# Patient Record
Sex: FEMALE | Race: WHITE
[De-identification: names, ages, dates, MRNs, and addresses within clinical notes are randomized per-mention and may not be internally consistent; named-entity substitution may affect disease eponyms.]

---

## 2021-11-30 ENCOUNTER — HOSPITAL ENCOUNTER (EMERGENCY)
Dept: HOSPITAL 12 - ER | Age: 51
Discharge: LEFT BEFORE DISCHARGE | End: 2021-11-30
Payer: COMMERCIAL

## 2021-11-30 VITALS — BODY MASS INDEX: 28.82 KG/M2 | HEIGHT: 65 IN | WEIGHT: 173 LBS

## 2021-11-30 VITALS — DIASTOLIC BLOOD PRESSURE: 71 MMHG | SYSTOLIC BLOOD PRESSURE: 131 MMHG

## 2021-11-30 DIAGNOSIS — R23.8: ICD-10-CM

## 2021-11-30 DIAGNOSIS — Z79.899: ICD-10-CM

## 2021-11-30 DIAGNOSIS — Z86.16: ICD-10-CM

## 2021-11-30 DIAGNOSIS — R00.2: Primary | ICD-10-CM

## 2021-11-30 DIAGNOSIS — E78.5: ICD-10-CM

## 2021-11-30 DIAGNOSIS — Z88.0: ICD-10-CM

## 2021-11-30 DIAGNOSIS — F41.9: ICD-10-CM

## 2021-11-30 DIAGNOSIS — R77.8: ICD-10-CM

## 2021-11-30 LAB
ALP SERPL-CCNC: 73 U/L (ref 50–136)
ALT SERPL W/O P-5'-P-CCNC: 35 U/L (ref 14–59)
AST SERPL-CCNC: 23 U/L (ref 15–37)
BILIRUB SERPL-MCNC: 0.4 MG/DL (ref 0.2–1)
BUN SERPL-MCNC: 18 MG/DL (ref 7–18)
CHLORIDE SERPL-SCNC: 103 MMOL/L (ref 98–107)
CO2 SERPL-SCNC: 28 MMOL/L (ref 21–32)
CREAT SERPL-MCNC: 1 MG/DL (ref 0.6–1.3)
GLUCOSE SERPL-MCNC: 123 MG/DL (ref 74–106)
HCT VFR BLD AUTO: 40.2 % (ref 31.2–41.9)
MCH RBC QN AUTO: 30.1 UUG (ref 24.7–32.8)
MCV RBC AUTO: 89.3 FL (ref 75.5–95.3)
PLATELET # BLD AUTO: 227 K/UL (ref 179–408)
POTASSIUM SERPL-SCNC: 4.3 MMOL/L (ref 3.5–5.1)
TSH SERPL DL<=0.005 MIU/L-ACNC: 1.6 MIU/ML (ref 0.36–3.74)
WS STN SPEC: 7.8 G/DL (ref 6.4–8.2)

## 2021-11-30 PROCEDURE — A4663 DIALYSIS BLOOD PRESSURE CUFF: HCPCS

## 2021-11-30 NOTE — NUR
PT DECIDED TO LEAVE HOSPITAL AMA. DR BARRAZA EXPLAINED ALL RISKS OF LEAVING 
HOSPITAL AMA TO THE PT. PT WERBALIZED FULL UNDERSTANDING. PT SIGNED AMA FORM 
AND LEFT HOSPITAL  BY TAXI.

## 2022-11-20 ENCOUNTER — HOSPITAL ENCOUNTER (INPATIENT)
Dept: HOSPITAL 54 - ER | Age: 52
LOS: 1 days | Discharge: HOME | DRG: 282 | End: 2022-11-21
Attending: INTERNAL MEDICINE | Admitting: NURSE PRACTITIONER
Payer: COMMERCIAL

## 2022-11-20 VITALS — WEIGHT: 175 LBS | HEIGHT: 66 IN | BODY MASS INDEX: 28.12 KG/M2

## 2022-11-20 DIAGNOSIS — I21.4: Primary | ICD-10-CM

## 2022-11-20 DIAGNOSIS — Z88.0: ICD-10-CM

## 2022-11-20 DIAGNOSIS — E78.00: ICD-10-CM

## 2022-11-20 DIAGNOSIS — Z86.16: ICD-10-CM

## 2022-11-20 DIAGNOSIS — F41.9: ICD-10-CM

## 2022-11-20 DIAGNOSIS — K57.30: ICD-10-CM

## 2022-11-20 DIAGNOSIS — I51.7: ICD-10-CM

## 2022-11-20 DIAGNOSIS — E87.6: ICD-10-CM

## 2022-11-20 LAB
ALBUMIN SERPL BCP-MCNC: 3.9 G/DL (ref 3.4–5)
ALP SERPL-CCNC: 59 U/L (ref 46–116)
ALT SERPL W P-5'-P-CCNC: 28 U/L (ref 12–78)
AST SERPL W P-5'-P-CCNC: 23 U/L (ref 15–37)
BASOPHILS # BLD AUTO: 0 K/UL (ref 0–0.2)
BASOPHILS NFR BLD AUTO: 0.1 % (ref 0–2)
BILIRUB DIRECT SERPL-MCNC: 0.1 MG/DL (ref 0–0.2)
BILIRUB SERPL-MCNC: 0.5 MG/DL (ref 0.2–1)
BILIRUB UR QL STRIP: NEGATIVE
BUN SERPL-MCNC: 14 MG/DL (ref 7–18)
CALCIUM SERPL-MCNC: 8.8 MG/DL (ref 8.5–10.1)
CHLORIDE SERPL-SCNC: 111 MMOL/L (ref 98–107)
CO2 SERPL-SCNC: 26 MMOL/L (ref 21–32)
COLOR UR: YELLOW
CREAT SERPL-MCNC: 0.8 MG/DL (ref 0.6–1.3)
EOSINOPHIL NFR BLD AUTO: 0.8 % (ref 0–6)
GLUCOSE SERPL-MCNC: 122 MG/DL (ref 74–106)
GLUCOSE UR STRIP-MCNC: NEGATIVE MG/DL
HCT VFR BLD AUTO: 39 % (ref 33–45)
HGB BLD-MCNC: 12.9 G/DL (ref 11.5–14.8)
LEUKOCYTE ESTERASE UR QL STRIP: (no result)
LYMPHOCYTES NFR BLD AUTO: 2.5 K/UL (ref 0.8–4.8)
LYMPHOCYTES NFR BLD AUTO: 34.2 % (ref 20–44)
MCHC RBC AUTO-ENTMCNC: 33 G/DL (ref 31–36)
MCV RBC AUTO: 90 FL (ref 82–100)
MONOCYTES NFR BLD AUTO: 0.8 K/UL (ref 0.1–1.3)
MONOCYTES NFR BLD AUTO: 10.6 % (ref 2–12)
NEUTROPHILS # BLD AUTO: 3.9 K/UL (ref 1.8–8.9)
NEUTROPHILS NFR BLD AUTO: 54.3 % (ref 43–81)
NITRITE UR QL STRIP: NEGATIVE
PH UR STRIP: 5.5 [PH] (ref 5–8)
PLATELET # BLD AUTO: 226 K/UL (ref 150–450)
POTASSIUM SERPL-SCNC: 3.3 MMOL/L (ref 3.5–5.1)
PROT SERPL-MCNC: 7.2 G/DL (ref 6.4–8.2)
PROT UR QL STRIP: NEGATIVE MG/DL
RBC # BLD AUTO: 4.37 MIL/UL (ref 4–5.2)
RBC #/AREA URNS HPF: (no result) /HPF (ref 0–2)
SODIUM SERPL-SCNC: 139 MMOL/L (ref 136–145)
UROBILINOGEN UR STRIP-MCNC: 0.2 EU/DL
WBC #/AREA URNS HPF: (no result) /HPF (ref 0–3)
WBC NRBC COR # BLD AUTO: 7.3 K/UL (ref 4.3–11)

## 2022-11-20 PROCEDURE — C9113 INJ PANTOPRAZOLE SODIUM, VIA: HCPCS

## 2022-11-20 PROCEDURE — C9803 HOPD COVID-19 SPEC COLLECT: HCPCS

## 2022-11-20 PROCEDURE — G0378 HOSPITAL OBSERVATION PER HR: HCPCS

## 2022-11-20 NOTE — NUR
BIBS C/O INTERMITTENT CP, N/V, BURPING X 1400. PATIENT IS AAOX4. ABLE TO MAKE 
NEEDS KNOWN. VOMITED SEVERAL TIMES AT ER. ATTACHED TO MONITOR. VITALS CHECKED.

## 2022-11-21 VITALS — SYSTOLIC BLOOD PRESSURE: 115 MMHG | DIASTOLIC BLOOD PRESSURE: 59 MMHG

## 2022-11-21 VITALS — SYSTOLIC BLOOD PRESSURE: 116 MMHG | DIASTOLIC BLOOD PRESSURE: 61 MMHG

## 2022-11-21 VITALS — DIASTOLIC BLOOD PRESSURE: 58 MMHG | SYSTOLIC BLOOD PRESSURE: 102 MMHG

## 2022-11-21 VITALS — SYSTOLIC BLOOD PRESSURE: 113 MMHG | DIASTOLIC BLOOD PRESSURE: 54 MMHG

## 2022-11-21 VITALS — SYSTOLIC BLOOD PRESSURE: 119 MMHG | DIASTOLIC BLOOD PRESSURE: 72 MMHG

## 2022-11-21 VITALS — DIASTOLIC BLOOD PRESSURE: 70 MMHG | SYSTOLIC BLOOD PRESSURE: 111 MMHG

## 2022-11-21 VITALS — DIASTOLIC BLOOD PRESSURE: 76 MMHG | SYSTOLIC BLOOD PRESSURE: 104 MMHG

## 2022-11-21 LAB
MAGNESIUM SERPL-MCNC: 2.3 MG/DL (ref 1.8–2.4)
PHOSPHATE SERPL-MCNC: 3.8 MG/DL (ref 2.5–4.9)
TSH SERPL DL<=0.005 MIU/L-ACNC: 2.29 UIU/ML (ref 0.36–3.74)

## 2022-11-21 NOTE — NUR
ORTHOSTATIC BP



LYING DOWN- 116/61 HR-69

SITTING- 119/72 HR-70

STANDING 104/76 HR-86



PATIENT DENIES ANY SYNCOPE FEELING/ LIGHT HEADEDNESS DURING AND AFTER ASSESSMENT.

## 2022-11-21 NOTE — NUR
RN NOTES



RECEIVED CALL FROM  JENNY SHAY THAT PT STILL WITH CRITICAL HIGH TROPONIN 314, DR STEELE MADE AWARE WITH NO NEW ORDER MADE AT THIS TIME.

## 2022-11-21 NOTE — NUR
RN NOTES



CTCA PROCEDURE WELL TOLERATED BY THE PT. PT IS AAOX4, NOT IN RESPIRATORY DISTRESS, V/S 
STABLE, KEPT RESTED AND COMFORTABLE. REPORT GIVEN TO PRAVEEN MENDIOLA FOR KATHLEEN.

## 2022-11-21 NOTE — NUR
noc rn note- re-assessment



bp 98/57, patient state relief of chest pain. will continue to monitor. reading sb on tele 
monitor with 57 bpm.

## 2022-11-21 NOTE — NUR
RN NOTES



CTCA RESULTS SHOWS NO SIGNIFICANT CORONARY ARTERY CALCIFICATION AND DISEASE. DR STEELE MADE 
AWARE AND CLEARED PT FOR DC--HOME. DR DIXON MADE AWARE TO PUT DOWN THE DC ORDER.

## 2022-11-21 NOTE — NUR
RN DISCHARGE NOTES



PT DISCHARGED HOME IN STABLE CONDITION. A/O X4. ABLE TO MAKE NEEDS KNOWN, AMBULATORY WITH 
STEADY GAIT. V/S TAKEN, STABLE AND RECORDED. PT WITH INTACT SKIN WITH NO IMPAIRMENTS. ALL 
BELONGINGS ACCOUNTED FOR AND PT SIGNED BELONGINGS LIST. IV ACCESS ON LAC G#20 REMOVED WITH 
NO ACTIVE BLEEDING NOTED, DRY PRESSURE DRESSING APPLIED AT SITE. HEALTH TEACHINGS/DISCHARGED 
INSTRUCTIONS GIVEN TO PT AND VERBALIZED UNDERSTANDING. EXIT FOLDER HANDED TO PT. TELE-BOX 
REMOVED FROM PT AND HANDED TO [place], MONITOR TECH.  PT LEFT UNIT @ 1755 AMBULATORY  
ACCOMPANIED BY ME AND HER DAUGHTER ORQUIDEA.  MD AND CHARGE NURSE AWARE OF DISCHARGE.

## 2022-11-21 NOTE — NUR
RN ADMISSION NOTES



PATIENT BROUGHT IN UNIT VIA STRETCHER AT AROUND THIS TIME. ACCOMPANIED BY 1 ER PERSONNEL. 
PATIENT IS A/OX4. NO S/S OF APPARENT DISTRESS ON ROOM AIR. PATIENT C/O "A LITTLE" CHEST PAIN 
THAT IS NON-RADIATING. DENIES ANY N/V AT THIS TIME. TELE MONITOR READING SR IN THE 60'S. NEW 
ID BAND ON PATIENT. BELONGINGS CHECKED. PATIENT WISHES TO BE FULL CODE AT THIS TIME. 
UP-TO-DATE WITH HER FLU AND COVID IMMUNIZATIONS. DENIES SMOKING AND ALCOHOL ABUSE. PATIENT 
HAS IV ACCESS ON LT. AC #20G. PATIENT ORIENTED IN THE UNIT AND THE USE OF CALL LIGHT. 
AMBULATORY WITH SAFETY GAIT. PATIENT REFUSED TO BE IN A HOSPITAL GOWN BECAUSE PER HER SHE 
WANTS TO BE COMFORTABLE. PATIENT HAS INTACT SKIN. NEEDS ATTENDED FOR NOW. WILL FOLLOW 
THROUGH DOCTOR'S ORDERS AND CONTINUE WITH THE PLAN OF CARE FOR PATIENT.

## 2022-11-21 NOTE — NUR
noc rn note



Patient c/o a little chest pain that is non-radiating per patient. given Nitro as ordered 
one time. bp 113/54 hr- 76. patient teaching done about risk and benefits of nitro. patient 
acknowledged. will re-assess after 5 min.

## 2022-11-21 NOTE — NUR
TELE RN OPENING NOTES



RECEIVED PT AWAKE IN BED IN NO ACUTE SIGN SOF DISTRESS. A/O x4, ABLE TO MAKE NEEDS KNOWN, 
DENIES PAIN OR ANY DISCOMFORTS AT THIS TIME. ON ROOM AIR, TOLERATING WELL WITH NO S/S OF 
RESPIRATORY DISTRESS. ON TELE MONITORING SHOWING NSR, HR 60 AT THIS TIME, NO C/O CARDIAC 
DISTRESS VOICED. IV ACCESS ON LAC G#20 INTACT, PATENT AND FLUSHES WELL. SAFETY MEASURES IN 
PLACE: BED LOCKED AND IN LOWEST POSITION, SIDE RAILS UP x2, CALL LIGHT WITHIN REACH. WILL 
CONTINUE TO MONITOR PT ACCORDINGLY.

## 2025-06-14 ENCOUNTER — HOSPITAL ENCOUNTER (INPATIENT)
Dept: HOSPITAL 12 - ER | Age: 55
LOS: 2 days | Discharge: HOME | DRG: 205 | End: 2025-06-16
Payer: COMMERCIAL

## 2025-06-14 VITALS — WEIGHT: 174 LBS | BODY MASS INDEX: 28.99 KG/M2 | HEIGHT: 65 IN

## 2025-06-14 DIAGNOSIS — Z88.0: ICD-10-CM

## 2025-06-14 DIAGNOSIS — Z79.899: ICD-10-CM

## 2025-06-14 DIAGNOSIS — Z82.49: ICD-10-CM

## 2025-06-14 DIAGNOSIS — F41.9: ICD-10-CM

## 2025-06-14 DIAGNOSIS — M94.0: Primary | ICD-10-CM

## 2025-06-14 DIAGNOSIS — E78.00: ICD-10-CM

## 2025-06-14 DIAGNOSIS — I21.A1: ICD-10-CM

## 2025-06-14 PROCEDURE — G0378 HOSPITAL OBSERVATION PER HR: HCPCS

## 2025-06-15 VITALS — DIASTOLIC BLOOD PRESSURE: 57 MMHG | SYSTOLIC BLOOD PRESSURE: 120 MMHG | OXYGEN SATURATION: 96 % | TEMPERATURE: 94.7 F

## 2025-06-15 VITALS — TEMPERATURE: 98.3 F | SYSTOLIC BLOOD PRESSURE: 123 MMHG | DIASTOLIC BLOOD PRESSURE: 71 MMHG | OXYGEN SATURATION: 98 %

## 2025-06-15 VITALS — DIASTOLIC BLOOD PRESSURE: 61 MMHG | SYSTOLIC BLOOD PRESSURE: 117 MMHG | OXYGEN SATURATION: 96 % | TEMPERATURE: 97.3 F

## 2025-06-15 LAB
ALBUMIN SERPL BCG-MCNC: 3.5 G/DL (ref 3.4–5)
ALP SERPL-CCNC: 71 U/L (ref 50–136)
ALT SERPL W/O P-5'-P-CCNC: 14 U/L (ref 14–59)
ANISOCYTOSIS BLD QL: (no result)
ANISOCYTOSIS BLD QL: (no result)
AST SERPL-CCNC: 14 U/L (ref 15–37)
AUER BODIES BLD QL SMEAR: (no result)
AUER BODIES BLD QL SMEAR: (no result)
BASO STIPL BLD QL SMEAR: (no result)
BASO STIPL BLD QL SMEAR: (no result)
BASOPHILS # BLD AUTO: 0 K/UL (ref 0–0.2)
BASOPHILS # BLD AUTO: 0 K/UL (ref 0–0.2)
BASOPHILS NFR BLD AUTO: 0.2 % (ref 0–2)
BASOPHILS NFR BLD AUTO: 0.5 % (ref 0–2)
BILIRUB DIRECT SERPL-MCNC: 0.1 MG/DL (ref 0–0.2)
BILIRUB SERPL-MCNC: 0.3 MG/DL (ref 0.2–1)
BUN SERPL-MCNC: 18 MG/DL (ref 7–18)
BUN SERPL-MCNC: 22 MG/DL (ref 7–18)
CABOT RINGS BLD QL SMEAR: (no result)
CABOT RINGS BLD QL SMEAR: (no result)
CALCIUM SERPL-MCNC: 9.1 MG/DL (ref 8.5–10.1)
CALCIUM SERPL-MCNC: 9.4 MG/DL (ref 8.5–10.1)
CHLORIDE SERPL-SCNC: 105 MMOL/L (ref 98–107)
CHLORIDE SERPL-SCNC: 105 MMOL/L (ref 98–107)
CO2 SERPL-SCNC: 26 MMOL/L (ref 21–32)
CO2 SERPL-SCNC: 27 MMOL/L (ref 21–32)
CREAT SERPL-MCNC: 0.7 MG/DL (ref 0.6–1.3)
CREAT SERPL-MCNC: 0.8 MG/DL (ref 0.6–1.3)
DACRYOCYTES BLD QL SMEAR: (no result)
DACRYOCYTES BLD QL SMEAR: (no result)
DOHLE BOD BLD QL SMEAR: (no result)
DOHLE BOD BLD QL SMEAR: (no result)
EOSINOPHIL # BLD AUTO: 0 K/UL (ref 0–0.7)
EOSINOPHIL # BLD AUTO: 0.1 K/UL (ref 0–0.7)
EOSINOPHIL NFR BLD AUTO: 0.5 % (ref 0–7)
EOSINOPHIL NFR BLD AUTO: 0.9 % (ref 0–7)
ERYTHROCYTE [DISTWIDTH] IN BLOOD BY AUTOMATED COUNT: 13.1 % (ref 12.3–17.7)
ERYTHROCYTE [DISTWIDTH] IN BLOOD BY AUTOMATED COUNT: 13.5 % (ref 12.3–17.7)
GLUCOSE SERPL-MCNC: 111 MG/DL (ref 74–106)
GLUCOSE SERPL-MCNC: 117 MG/DL (ref 74–106)
HCT VFR BLD AUTO: 38.6 % (ref 31.2–41.9)
HCT VFR BLD AUTO: 38.6 % (ref 31.2–41.9)
HELMET CELLS BLD QL SMEAR: (no result)
HELMET CELLS BLD QL SMEAR: (no result)
HGB BLD-MCNC: 13.1 G/DL (ref 10.9–14.3)
HGB BLD-MCNC: 13.1 G/DL (ref 10.9–14.3)
HOWELL-JOLLY BOD BLD QL SMEAR: (no result)
HOWELL-JOLLY BOD BLD QL SMEAR: (no result)
HYPOCHROMIA BLD QL: (no result)
HYPOCHROMIA BLD QL: (no result)
LYMPHOCYTES # BLD AUTO: 1.5 K/UL (ref 0.8–4.8)
LYMPHOCYTES # BLD AUTO: 1.9 K/UL (ref 0.8–4.8)
LYMPHOCYTES NFR BLD AUTO: 23.8 % (ref 20.5–51.5)
LYMPHOCYTES NFR BLD AUTO: 25 % (ref 20.5–51.5)
MACROCYTES BLD QL: (no result)
MACROCYTES BLD QL: (no result)
MAGNESIUM SERPL-MCNC: 2.1 MG/DL (ref 1.8–2.4)
MANUAL DIF COMMENT BLD-IMP: 1
MANUAL DIF COMMENT BLD-IMP: 1
MCH RBC QN AUTO: 30.2 UUG (ref 24.7–32.8)
MCH RBC QN AUTO: 30.3 UUG (ref 24.7–32.8)
MCHC RBC AUTO-ENTMCNC: 34 G/DL (ref 32.3–35.6)
MCHC RBC AUTO-ENTMCNC: 34 G/DL (ref 32.3–35.6)
MCV RBC AUTO: 88.7 FL (ref 75.5–95.3)
MCV RBC AUTO: 89.3 FL (ref 75.5–95.3)
MONOCYTES # BLD AUTO: 0.4 K/UL (ref 0.1–1.3)
MONOCYTES # BLD AUTO: 0.6 K/UL (ref 0.1–1.3)
MONOCYTES NFR BLD AUTO: 5.8 % (ref 0–11)
MONOCYTES NFR BLD AUTO: 8 % (ref 0–11)
NEUTROPHILS # BLD AUTO: 4.4 K/UL (ref 1.8–8.9)
NEUTROPHILS # BLD AUTO: 5 K/UL (ref 1.8–8.9)
NEUTROPHILS NFR BLD AUTO: 65.6 % (ref 38.5–71.5)
NEUTROPHILS NFR BLD AUTO: 69.7 % (ref 38.5–71.5)
NEUTS HYPERSEG # BLD: (no result) 10*3/UL
NEUTS HYPERSEG # BLD: (no result) 10*3/UL
NT-PROBNP SERPL-MCNC: 21 PG/ML (ref 0–125)
NT-PROBNP SERPL-MCNC: 26 PG/ML (ref 0–125)
OVALOCYTES BLD QL SMEAR: (no result)
OVALOCYTES BLD QL SMEAR: (no result)
PAPPENHEIMER BOD BLD QL SMEAR: (no result)
PAPPENHEIMER BOD BLD QL SMEAR: (no result)
PHOSPHATE SERPL-MCNC: 4.1 MG/DL (ref 2.5–4.9)
PLATELET # BLD AUTO: 213 K/UL (ref 179–408)
PLATELET # BLD AUTO: 223 K/UL (ref 179–408)
POTASSIUM SERPL-SCNC: 4 MMOL/L (ref 3.5–5.1)
POTASSIUM SERPL-SCNC: 4 MMOL/L (ref 3.5–5.1)
RBC # BLD AUTO: 4.32 MIL/UL (ref 3.63–4.92)
RBC # BLD AUTO: 4.35 MIL/UL (ref 3.63–4.92)
ROULEAUX BLD QL SMEAR: (no result)
ROULEAUX BLD QL SMEAR: (no result)
SMUDGE CELLS BLD QL SMEAR: (no result)
SMUDGE CELLS BLD QL SMEAR: (no result)
SODIUM SERPL-SCNC: 140 MMOL/L (ref 136–145)
SODIUM SERPL-SCNC: 140 MMOL/L (ref 136–145)
STOMATOCYTES BLD QL SMEAR: (no result)
STOMATOCYTES BLD QL SMEAR: (no result)
TARGETS BLD QL SMEAR: (no result)
TARGETS BLD QL SMEAR: (no result)
TOXIC GRANULES BLD QL SMEAR: (no result)
TOXIC GRANULES BLD QL SMEAR: (no result)
WBC # BLD AUTO: 6.3 K/UL (ref 3.8–11.8)
WBC # BLD AUTO: 7.7 K/UL (ref 3.8–11.8)
WS STN SPEC: 7.1 G/DL (ref 6.4–8.2)

## 2025-06-15 RX ADMIN — TEMAZEPAM SCH MG: 15 CAPSULE ORAL at 20:54

## 2025-06-15 RX ADMIN — HEPARIN SODIUM SCH UNITS: 5000 INJECTION, SOLUTION INTRAVENOUS; SUBCUTANEOUS at 08:28

## 2025-06-15 RX ADMIN — LORAZEPAM ONE MG: 2 INJECTION INTRAMUSCULAR; INTRAVENOUS at 04:41

## 2025-06-16 VITALS — OXYGEN SATURATION: 97 % | SYSTOLIC BLOOD PRESSURE: 124 MMHG | DIASTOLIC BLOOD PRESSURE: 65 MMHG | TEMPERATURE: 98 F

## 2025-06-16 VITALS — OXYGEN SATURATION: 97 % | DIASTOLIC BLOOD PRESSURE: 59 MMHG | TEMPERATURE: 98.5 F | SYSTOLIC BLOOD PRESSURE: 112 MMHG

## 2025-06-16 VITALS — OXYGEN SATURATION: 95 % | DIASTOLIC BLOOD PRESSURE: 54 MMHG | TEMPERATURE: 98.5 F | SYSTOLIC BLOOD PRESSURE: 101 MMHG

## 2025-06-16 VITALS — TEMPERATURE: 98 F | DIASTOLIC BLOOD PRESSURE: 57 MMHG | SYSTOLIC BLOOD PRESSURE: 117 MMHG | OXYGEN SATURATION: 97 %

## 2025-06-16 LAB
ANISOCYTOSIS BLD QL: (no result)
AUER BODIES BLD QL SMEAR: (no result)
BASO STIPL BLD QL SMEAR: (no result)
BASOPHILS # BLD AUTO: 0 K/UL (ref 0–0.2)
BASOPHILS NFR BLD AUTO: 0.2 % (ref 0–2)
BUN SERPL-MCNC: 16 MG/DL (ref 7–18)
CABOT RINGS BLD QL SMEAR: (no result)
CALCIUM SERPL-MCNC: 9.6 MG/DL (ref 8.5–10.1)
CHLORIDE SERPL-SCNC: 104 MMOL/L (ref 98–107)
CO2 SERPL-SCNC: 29 MMOL/L (ref 21–32)
CREAT SERPL-MCNC: 0.8 MG/DL (ref 0.6–1.3)
DACRYOCYTES BLD QL SMEAR: (no result)
DOHLE BOD BLD QL SMEAR: (no result)
EOSINOPHIL # BLD AUTO: 0.1 K/UL (ref 0–0.7)
EOSINOPHIL NFR BLD AUTO: 1.5 % (ref 0–7)
ERYTHROCYTE [DISTWIDTH] IN BLOOD BY AUTOMATED COUNT: 13.3 % (ref 12.3–17.7)
GLUCOSE SERPL-MCNC: 116 MG/DL (ref 74–106)
HCT VFR BLD AUTO: 40.7 % (ref 31.2–41.9)
HELMET CELLS BLD QL SMEAR: (no result)
HGB BLD-MCNC: 13.5 G/DL (ref 10.9–14.3)
HOWELL-JOLLY BOD BLD QL SMEAR: (no result)
HYPOCHROMIA BLD QL: (no result)
LYMPHOCYTES # BLD AUTO: 2.1 K/UL (ref 0.8–4.8)
LYMPHOCYTES NFR BLD AUTO: 36.1 % (ref 20.5–51.5)
MACROCYTES BLD QL: (no result)
MAGNESIUM SERPL-MCNC: 2.2 MG/DL (ref 1.8–2.4)
MANUAL DIF COMMENT BLD-IMP: 1
MCH RBC QN AUTO: 30.1 UUG (ref 24.7–32.8)
MCHC RBC AUTO-ENTMCNC: 33 G/DL (ref 32.3–35.6)
MCV RBC AUTO: 90.6 FL (ref 75.5–95.3)
MONOCYTES # BLD AUTO: 0.4 K/UL (ref 0.1–1.3)
MONOCYTES NFR BLD AUTO: 6.8 % (ref 0–11)
NEUTROPHILS # BLD AUTO: 3.3 K/UL (ref 1.8–8.9)
NEUTROPHILS NFR BLD AUTO: 55.4 % (ref 38.5–71.5)
NEUTS HYPERSEG # BLD: (no result) 10*3/UL
OVALOCYTES BLD QL SMEAR: (no result)
PAPPENHEIMER BOD BLD QL SMEAR: (no result)
PHOSPHATE SERPL-MCNC: 3.5 MG/DL (ref 2.5–4.9)
PLATELET # BLD AUTO: 227 K/UL (ref 179–408)
POTASSIUM SERPL-SCNC: 3.7 MMOL/L (ref 3.5–5.1)
RBC # BLD AUTO: 4.49 MIL/UL (ref 3.63–4.92)
ROULEAUX BLD QL SMEAR: (no result)
SMUDGE CELLS BLD QL SMEAR: (no result)
SODIUM SERPL-SCNC: 139 MMOL/L (ref 136–145)
STOMATOCYTES BLD QL SMEAR: (no result)
TARGETS BLD QL SMEAR: (no result)
TOXIC GRANULES BLD QL SMEAR: (no result)
WBC # BLD AUTO: 5.9 K/UL (ref 3.8–11.8)

## 2025-06-16 RX ADMIN — ATORVASTATIN CALCIUM SCH MG: 40 TABLET, FILM COATED ORAL at 08:49

## 2025-06-16 RX ADMIN — ASPIRIN SCH MG: 81 TABLET, CHEWABLE ORAL at 08:49
